# Patient Record
(demographics unavailable — no encounter records)

---

## 2024-10-30 NOTE — HISTORY OF PRESENT ILLNESS
[Home] : at home, [unfilled] , at the time of the visit. [Medical Office: ([facility])___] : at the medical office located in  [Verbal consent obtained from patient] : the patient, [unfilled] [de-identified] : 59 y/o male patient seen today via telehealth visit: - f/u Obesity/weight management - currently on Zepbound 2.5mg weekly, denies any side effects, no GI issues, feels helping decrease appetite, control portions, has been more physically active.  - Total weight loss: 9 lbs

## 2025-02-27 NOTE — HISTORY OF PRESENT ILLNESS
[Home] : at home, [unfilled] , at the time of the visit. [Other Location: e.g. Home (Enter Location, City,State)___] : at [unfilled] [Telehealth (audio & video)] : This visit was provided via telehealth using real-time 2-way audio visual technology. [Verbal consent obtained from patient] : the patient, [unfilled] [FreeTextEntry1] : follow up  [de-identified] : 58 year old male here for follow up on zepbound. The patient states he is doing well goal weight is 170lbs. denies any compliants no n/v abdominal pain. patient states he was doing so great for a period of time and now has been stuck at a plateau of bouncing between 211-213lbs. patient would like to increase dosage. no compliants. has a CPE June with a new provider will send labs

## 2025-06-03 NOTE — HISTORY OF PRESENT ILLNESS
[Home] : at home, [unfilled] , at the time of the visit. [Medical Office: ([facility])___] : at the medical office located in  [Telehealth (audio & video)] : This visit was provided via telehealth using real-time 2-way audio visual technology. [Verbal consent obtained from patient] : the patient, [unfilled] [FreeTextEntry1] : follow up  [de-identified] : 58 year old male calls in for weight management. patient states hes doing great feels amazing has a  3 days a week does cardio 3 days as well has been eating healthy. patient states medication is working well still losing weight and has decrease appetite. denies any side effects no abdominal pain no n/v going to see primary this week. stopped synthryoid suddenly tsh went to up then restarted.